# Patient Record
Sex: FEMALE | Race: AMERICAN INDIAN OR ALASKA NATIVE
[De-identification: names, ages, dates, MRNs, and addresses within clinical notes are randomized per-mention and may not be internally consistent; named-entity substitution may affect disease eponyms.]

---

## 2020-01-17 ENCOUNTER — HOSPITAL ENCOUNTER (OUTPATIENT)
Dept: HOSPITAL 5 - SPVWC | Age: 50
Discharge: HOME | End: 2020-01-17
Attending: FAMILY MEDICINE
Payer: COMMERCIAL

## 2020-01-17 DIAGNOSIS — Z12.31: Primary | ICD-10-CM

## 2020-01-17 PROCEDURE — 77067 SCR MAMMO BI INCL CAD: CPT

## 2020-01-20 NOTE — MAMMOGRAPHY REPORT
DIGITAL SCREENING MAMMOGRAM WITH CAD, 1/17/2020



INDICATION: Routine screening mammography. 



TECHNIQUE:  Digital bilateral  2D mammography was obtained in the craniocaudal and mediolateral obliq
ue projections. This examination was interpreted with the benefit of Computer-Aided Detection analysi
s.



COMPARISON: None available. She did indicate that she had a prior mammogram at [facility].



FINDINGS: 



Breast Density: The breasts are heterogeneously dense, which may obscure small masses.



There is no evidence of dominant mass, suspicious calcifications or architectural distortion in eithe
r breast.



IMPRESSION: No mammographic evidence of malignancy.





Follow up recommendation: Routine yearly



BI-RADS Category 1:  Negative.



A "normal" or negative report should not discourage follow up or biopsy of a clinically significant f
inding.



A written summary of these findings will be mailed to the patient. The patient will be entered into a
 mammography reporting system which will generate a reminder letter for the patient's next appointmen
t at the appropriate interval.



The [employer] recommends yearly mammograms starting at age 40 and continuing as l
ethel as a woman is in good health.  Breast MRI is recommended for women with an approximate 20-25% or 
greater lifetime risk of breast cancer, including women with a strong family history of breast or ova
adolph cancer or who have been treated for Hodgkin's disease.



Signer Name: Jerman Florence MD 

Signed: 1/20/2020 11:05 AM

 Workstation Name: [id number]